# Patient Record
Sex: FEMALE | Race: OTHER | HISPANIC OR LATINO | URBAN - METROPOLITAN AREA
[De-identification: names, ages, dates, MRNs, and addresses within clinical notes are randomized per-mention and may not be internally consistent; named-entity substitution may affect disease eponyms.]

---

## 2020-03-30 ENCOUNTER — EMERGENCY (EMERGENCY)
Facility: HOSPITAL | Age: 50
LOS: 1 days | Discharge: HOME | End: 2020-03-30
Attending: EMERGENCY MEDICINE
Payer: MEDICAID

## 2020-03-30 VITALS
TEMPERATURE: 98 F | RESPIRATION RATE: 16 BRPM | DIASTOLIC BLOOD PRESSURE: 85 MMHG | OXYGEN SATURATION: 99 % | SYSTOLIC BLOOD PRESSURE: 128 MMHG | HEART RATE: 74 BPM

## 2020-03-30 VITALS
TEMPERATURE: 99 F | DIASTOLIC BLOOD PRESSURE: 77 MMHG | OXYGEN SATURATION: 98 % | HEART RATE: 80 BPM | HEIGHT: 59.06 IN | RESPIRATION RATE: 16 BRPM | SYSTOLIC BLOOD PRESSURE: 114 MMHG | WEIGHT: 139.99 LBS

## 2020-03-30 PROCEDURE — 99282 EMERGENCY DEPT VISIT SF MDM: CPT

## 2020-03-30 PROCEDURE — 99284 EMERGENCY DEPT VISIT MOD MDM: CPT

## 2020-03-30 RX ORDER — ACETAMINOPHEN 500 MG
975 TABLET ORAL ONCE
Refills: 0 | Status: COMPLETED | OUTPATIENT
Start: 2020-03-30 | End: 2020-03-30

## 2020-03-30 RX ADMIN — Medication 975 MILLIGRAM(S): at 10:23

## 2020-03-30 NOTE — ED ADULT NURSE NOTE - OBJECTIVE STATEMENT
The pt is a 50 y/o F no PMH c/o headache The pt is a 48 y/o F no PMH c/o nausea, fever, cough, sore throat x 11 days, headache x 4 days. Pt has been taking Tylenol for fever. Pt denies pain when moving neck, pt has full ROM in neck. Pt educated to self quarantine, will continue to monitor.

## 2020-03-30 NOTE — ED PROVIDER NOTE - CARE PLAN
Principal Discharge DX:	Fever  Secondary Diagnosis:	Shortness of breath Principal Discharge DX:	Fever  Secondary Diagnosis:	Acute non intractable tension-type headache

## 2020-03-30 NOTE — ED PROVIDER NOTE - NSFOLLOWUPCLINICS_GEN_ALL_ED_FT
[facility] General Internal Medicine  General Internal Medicine  [address] [id number]  Phone: [phone]  Fax:   Follow Up Time:

## 2020-03-30 NOTE — ED PROVIDER NOTE - PATIENT PORTAL LINK FT
You can access the FollowMyHealth Patient Portal offered by [facility] by registering at the following website: [email or web address]. By joining [facility]’s FollowMyHealth portal, you will also be able to view your health information using other applications (apps) compatible with our system.

## 2020-03-30 NOTE — ED PROVIDER NOTE - NSFOLLOWUPINSTRUCTIONS_ED_ALL_ED_FT
You were seen and evaluated in the Emergency Department for your viral upper respiratory-like symptoms. You were evaluated clinically and with laboratory studies - at this time your symptoms, history, and presentation do not suggest that you are at high-risk for Corona Virus (COVID). We have sent off a respiratory viral panel for further evaluation of your illness. You can call back for your results by [phone] of your viral panel; please see the attached COVID information packet for management of your symptoms as well as numbers to call for your COVID test results, and more information on the next steps including your self-quarantine measures.     At this time your clinical evaluation and history do not demonstrate any acute, life-threatening medical conditions warranting emergent treatment. However, we strongly recommend you follow up with one of our Primary Care Doctors (or your own) for further evaluation of your symptoms by calling the following number to make an appointment:    [facility] Internal Medicine  General Internal Medicine  [address] [place] [address]  Phone: [phone]    Continue to maintain oral hydration, with plenty of fluids; take Tylenol and Motrin, every 8 hours with food (following the instructions on the medication insert information sheet for dosing) for fever control.     Should you develop new or worsening chest pain, shortness of breath, abdominal pain, fevers, chills, nausea, vomiting, diarrhea, or constipation - please return to the ED for immediate evaluation.     We also strongly encourage you make an appointment with your Primary Care Physician for a comprehensive evaluation of your health.

## 2020-03-30 NOTE — ED PROVIDER NOTE - PROGRESS NOTE DETAILS
translater phone and  used In accordance with the current estabilished [facility] protocol for suspected COVID-19 patients, this patient does not meet criteria for urgent COVID-19 PCR testing in the ED. The patient will be advised to self-quarantine for 14 days and will be logged into our institutional [employer] database for surveillance.

## 2020-03-30 NOTE — ED PROVIDER NOTE - CLINICAL SUMMARY MEDICAL DECISION MAKING FREE TEXT BOX
maryann - pt w 11 days of fever and cough - no sob - sats 98 on ra no desat w ambulation - lungs clear op clear no exudate co frontAl ha x 4 days improved w Tylenol -- no neck pain or stiffness no meningismus no rash - pos sick contacts 4-5 ppl in family are febrile w cough or diarrhea -- pt likely with covid 19 infection, not tachypneic, no tachycardia normaql pulse ox will dc with self quarantine and tylenol for ha -- as ha is 4 dasys old no trauma no neuro s/s - resolved with tylenol less worrisome at this time especially in combination with other constitutional s/s.

## 2020-03-30 NOTE — ED PROVIDER NOTE - OBJECTIVE STATEMENT
49 y.o. female coming in with 10 days of headaches and fevers at home, 4 days ago started with frontal head pressure.  + diffuse myalgias.  + sore throat.  Has been taking Tylenol at home with some relief.  No abd pain, no nausea, no vomiting.  No dysuria, frequency.  No chest pain but + SoB that is worse with exertion.  + sick contact () home. 49 y.o. female coming in with 10 days of headaches and fevers at home, 4 days ago started with frontal head pressure.  + diffuse myalgias.  + sore throat.  Has been taking Tylenol at home with some relief.  No abd pain, no nausea, no vomiting.  No dysuria, frequency.  No chest pain but + SoB that is worse with exertion.  + sick contact () home.  maryann - on reeavluation pt denies any sob only co cough dry